# Patient Record
Sex: MALE | Race: OTHER | Employment: OTHER | URBAN - METROPOLITAN AREA
[De-identification: names, ages, dates, MRNs, and addresses within clinical notes are randomized per-mention and may not be internally consistent; named-entity substitution may affect disease eponyms.]

---

## 2017-04-19 ENCOUNTER — OFFICE VISIT (OUTPATIENT)
Dept: FAMILY MEDICINE CLINIC | Facility: CLINIC | Age: 67
End: 2017-04-19

## 2017-04-19 VITALS — WEIGHT: 137 LBS | DIASTOLIC BLOOD PRESSURE: 76 MMHG | SYSTOLIC BLOOD PRESSURE: 133 MMHG | HEART RATE: 86 BPM

## 2017-04-19 DIAGNOSIS — K64.8 OTHER HEMORRHOIDS: ICD-10-CM

## 2017-04-19 DIAGNOSIS — I10 ESSENTIAL HYPERTENSION: Primary | ICD-10-CM

## 2017-04-19 PROCEDURE — G0463 HOSPITAL OUTPT CLINIC VISIT: HCPCS | Performed by: FAMILY MEDICINE

## 2017-04-19 PROCEDURE — 99202 OFFICE O/P NEW SF 15 MIN: CPT | Performed by: FAMILY MEDICINE

## 2017-04-19 RX ORDER — AMLODIPINE BESYLATE 5 MG/1
TABLET ORAL
Refills: 3 | COMMUNITY
Start: 2017-02-28

## 2017-04-19 RX ORDER — HYDROCHLOROTHIAZIDE 12.5 MG/1
12.5 TABLET ORAL
Refills: 11 | COMMUNITY
Start: 2017-02-24 | End: 2017-04-19 | Stop reason: ALTCHOICE

## 2017-04-19 RX ORDER — METHYLPREDNISOLONE 4 MG/1
TABLET ORAL
Refills: 0 | COMMUNITY
Start: 2017-02-28 | End: 2017-04-19 | Stop reason: ALTCHOICE

## 2017-04-19 RX ORDER — AMLODIPINE BESYLATE AND BENAZEPRIL HYDROCHLORIDE 5; 20 MG/1; MG/1
1 CAPSULE ORAL
Refills: 11 | COMMUNITY
Start: 2017-02-24 | End: 2017-04-19 | Stop reason: ALTCHOICE

## 2017-04-19 NOTE — PROGRESS NOTES
Blood pressure 133/76, pulse 86, weight 137 lb (62.143 kg). Patient presents today complaining of a lump in his rectum that he has had for a week. Burning with defecation. History of hypertension no chest pain no dyspnea.   Last blood draw was 3 years ag

## 2017-05-01 ENCOUNTER — OFFICE VISIT (OUTPATIENT)
Dept: SURGERY | Facility: CLINIC | Age: 67
End: 2017-05-01

## 2017-05-01 VITALS — WEIGHT: 137 LBS | BODY MASS INDEX: 21.5 KG/M2 | HEIGHT: 67 IN

## 2017-05-01 DIAGNOSIS — K62.1 RECTAL POLYP: ICD-10-CM

## 2017-05-01 DIAGNOSIS — K64.3 GRADE IV HEMORRHOIDS: Primary | ICD-10-CM

## 2017-05-01 PROCEDURE — 99204 OFFICE O/P NEW MOD 45 MIN: CPT | Performed by: SURGERY

## 2017-05-01 PROCEDURE — 46600 DIAGNOSTIC ANOSCOPY SPX: CPT | Performed by: SURGERY

## 2017-05-01 NOTE — PROGRESS NOTES
Visit Note    Active Problems   Grade iv hemorrhoids  (primary encounter diagnosis)  Rectal polyp  Chief Complaint: Patient presents with:  Hemorrhoids: Pt referred by Dr. Princess Garcia regarding hemorrhoids x 4 wks.   Pt c/o pain & burning to area but has de difficulty urinating. Musculoskeletal: Negative. Skin: Negative. Allergic/Immunologic: Negative. Neurological: Negative. Hematological: Negative. Psychiatric/Behavioral: Negative.         Physical Findings   Ht 5' 7\" (1.702 m)  Wt 137 lb ( transfusion, Injury to nearby organs/bowel, recurrence, Incontinence for gas or stool (usually a temporary problem) PE, cardiorespiratory events, Pain acute or Chronic, risks of anesthesia, etc.        Follow Up No Follow-up on file.     No orders of the de

## 2017-05-03 NOTE — PATIENT INSTRUCTIONS
Assessment   Grade IV hemorrhoids  (primary encounter diagnosis)  Rectal polyp    Plan             Patient needs Internal/external Hemorrhoidectomy and excision of the distal rectal Polyp.  . Pros and cons and possible complication were presented and discus

## 2017-05-16 ENCOUNTER — LAB REQUISITION (OUTPATIENT)
Dept: LAB | Facility: HOSPITAL | Age: 67
End: 2017-05-16
Payer: MEDICARE

## 2017-05-16 DIAGNOSIS — Z01.89 ENCOUNTER FOR OTHER SPECIFIED SPECIAL EXAMINATIONS: ICD-10-CM

## 2017-05-16 PROCEDURE — 88304 TISSUE EXAM BY PATHOLOGIST: CPT | Performed by: SURGERY

## 2017-05-31 ENCOUNTER — OFFICE VISIT (OUTPATIENT)
Dept: SURGERY | Facility: CLINIC | Age: 67
End: 2017-05-31

## 2017-05-31 DIAGNOSIS — Z98.890 S/P HEMORRHOIDECTOMY: Primary | ICD-10-CM

## 2017-05-31 DIAGNOSIS — Z87.19 S/P HEMORRHOIDECTOMY: Primary | ICD-10-CM

## 2017-05-31 PROCEDURE — 99024 POSTOP FOLLOW-UP VISIT: CPT | Performed by: SURGERY

## 2017-05-31 PROCEDURE — G0463 HOSPITAL OUTPT CLINIC VISIT: HCPCS | Performed by: SURGERY

## 2017-05-31 RX ORDER — HYDROCODONE BITARTRATE AND ACETAMINOPHEN 7.5; 325 MG/1; MG/1
TABLET ORAL
Refills: 0 | COMMUNITY
Start: 2017-05-16

## 2017-06-02 NOTE — PROGRESS NOTES
Follow Up Visit Note       Active Problems   S/p hemorrhoidectomy  (primary encounter diagnosis)  Chief Complaint: Patient presents with:  Post-Op: First post-op.  S/P Excision of rectal polyp from the right side of the rectum and hemorrhoidectomy on 05/16/ reflexes. Skin: Skin is warm and dry. Psychiatric: He has a normal mood and affect.  His behavior is normal. Judgment and thought content normal.         Assessment   S/P hemorrhoidectomy  (primary encounter diagnosis)      Plan

## 2017-08-01 ENCOUNTER — TELEPHONE (OUTPATIENT)
Dept: FAMILY MEDICINE CLINIC | Facility: CLINIC | Age: 67
End: 2017-08-01

## 2017-08-01 NOTE — TELEPHONE ENCOUNTER
Actions Requested: Sent to ER  Problem: right lower quadrant abdominal pain  Onset and Timing: 3-4 days, on and off  Associated Symptoms: no appetite, RLQ tender to touch, stomach feels upset   Aggravating by: worse at night   Alleviated by:   Triage Note:

## 2017-08-02 NOTE — TELEPHONE ENCOUNTER
Spoke with wife, patient was admitted to [facility], states he was told he has colon cancer.

## 2021-03-05 ENCOUNTER — TELEPHONE (OUTPATIENT)
Dept: FAMILY MEDICINE CLINIC | Facility: CLINIC | Age: 71
End: 2021-03-05

## 2021-03-09 DIAGNOSIS — Z23 NEED FOR VACCINATION: ICD-10-CM

## 2021-08-20 ENCOUNTER — HOSPITAL ENCOUNTER (OUTPATIENT)
Age: 71
Discharge: HOME | End: 2021-08-20
Payer: MEDICARE

## 2021-08-20 VITALS
RESPIRATION RATE: 18 BRPM | HEART RATE: 81 BPM | SYSTOLIC BLOOD PRESSURE: 121 MMHG | WEIGHT: 158 LBS | HEIGHT: 68 IN | DIASTOLIC BLOOD PRESSURE: 74 MMHG | OXYGEN SATURATION: 95 % | TEMPERATURE: 99 F | BODY MASS INDEX: 23.95 KG/M2

## 2021-08-20 DIAGNOSIS — Z11.52 ENCOUNTER FOR SCREENING FOR COVID-19: Primary | ICD-10-CM

## 2021-08-20 DIAGNOSIS — Z20.822 LAB TEST NEGATIVE FOR COVID-19 VIRUS: ICD-10-CM

## 2021-08-20 LAB — SARS-COV-2 RNA RESP QL NAA+PROBE: NOT DETECTED

## 2021-08-20 PROCEDURE — 99202 OFFICE O/P NEW SF 15 MIN: CPT | Performed by: NURSE PRACTITIONER

## 2021-08-20 PROCEDURE — U0002 COVID-19 LAB TEST NON-CDC: HCPCS | Performed by: NURSE PRACTITIONER

## 2021-08-20 NOTE — ED PROVIDER NOTES
Patient Seen in: [facility]      History   Patient presents with:  Testing    Stated Complaint: Covid testing    HPI/Subjective:   Well-appearing 68-year-old male with a history of hypertension presents for COVID-19 testing.  Patient communic time. Pt appears non-toxic. HEENT: Head is normocephalic, atraumatic. No conjunctival injection. No facial droop or slurred speech. No oral lesions or pallor. Mucous membranes moist.     Neck: Supple. Normal ROM. Lungs: Good inspiratory effort.  No a

## (undated) NOTE — MR AVS SNAPSHOT
Mario Alberto  [address]  [phone]               Thank you for choosing us for your health care visit with Christa Nails MD.  We are glad to serve you and happy to provide you with this summa information, go to [email or web address] and click on the Sign Up Now link in the [employer] box. Enter your [employer] Activation Code exactly as it appears below along with your Zip Code and Date of Birth to complete the sign-up process.  If you do

## (undated) NOTE — MR AVS SNAPSHOT
Mario Alberto  [address]  [phone]               Thank you for choosing us for your health care visit with Frederick Lewis MD.  We are glad to serve you and happy to provide you with this summa If you have questions, you can call [phone] to talk to our [facility] Staff. Remember, [employer] is NOT to be used for urgent needs. For medical emergencies, dial 911.            Visit [facility] online at  [email or web address]

## (undated) NOTE — MR AVS SNAPSHOT
[facility]  [address] [place] [address], [address] [place] [address]  Naomy Whitea               Thank you for choosing us for your health care visit with Flora Duque. DO [place].   We are glad to serve you and happy to provide you with this summary Return in about 4 weeks (around 5/17/2017) for [address] [place] [address].          Referral Details     Referred By    Referred To    DO Renee Valenzuela[address] [address]   Phone:  [phone]   Fax:  [phone]    [facility] office. At that time, you will be provided with any authorization numbers or be assured that none are required. You can then schedule your appointment. Failure to obtain required authorization numbers can create reimbursement difficulties for you.     Assoc Please consider the following Lifestyle Modifications. Also, please return for a follow-up Blood Pressure Check in 1 year.      Lifestyle Modification Recommendations:    Modification Recommendation   Weight Reduction Maintain normal body weight (body mass increments are effective and add up over the week   2 ½ hours per week – spread out over time Use a gloria to keep you motivated   Don’t forget strength training with weights and resistance Set goals and track your progress   You don’t need to join a gym.